# Patient Record
Sex: MALE | Race: WHITE | NOT HISPANIC OR LATINO | Employment: OTHER | URBAN - METROPOLITAN AREA
[De-identification: names, ages, dates, MRNs, and addresses within clinical notes are randomized per-mention and may not be internally consistent; named-entity substitution may affect disease eponyms.]

---

## 2024-03-20 ENCOUNTER — APPOINTMENT (OUTPATIENT)
Dept: CT IMAGING | Facility: CLINIC | Age: 74
End: 2024-03-20
Attending: EMERGENCY MEDICINE
Payer: COMMERCIAL

## 2024-03-20 PROCEDURE — 70450 CT HEAD/BRAIN W/O DYE: CPT

## 2024-03-20 PROCEDURE — 12002 RPR S/N/AX/GEN/TRNK2.6-7.5CM: CPT

## 2024-03-20 PROCEDURE — 99284 EMERGENCY DEPT VISIT MOD MDM: CPT | Mod: 25

## 2024-03-20 ASSESSMENT — COLUMBIA-SUICIDE SEVERITY RATING SCALE - C-SSRS
6. HAVE YOU EVER DONE ANYTHING, STARTED TO DO ANYTHING, OR PREPARED TO DO ANYTHING TO END YOUR LIFE?: NO
1. IN THE PAST MONTH, HAVE YOU WISHED YOU WERE DEAD OR WISHED YOU COULD GO TO SLEEP AND NOT WAKE UP?: NO
2. HAVE YOU ACTUALLY HAD ANY THOUGHTS OF KILLING YOURSELF IN THE PAST MONTH?: NO

## 2024-03-21 ENCOUNTER — HOSPITAL ENCOUNTER (EMERGENCY)
Facility: CLINIC | Age: 74
Discharge: HOME | End: 2024-03-21
Attending: EMERGENCY MEDICINE | Admitting: EMERGENCY MEDICINE
Payer: COMMERCIAL

## 2024-03-21 VITALS
SYSTOLIC BLOOD PRESSURE: 141 MMHG | RESPIRATION RATE: 18 BRPM | HEART RATE: 68 BPM | BODY MASS INDEX: 29.77 KG/M2 | TEMPERATURE: 99 F | OXYGEN SATURATION: 99 % | WEIGHT: 201 LBS | DIASTOLIC BLOOD PRESSURE: 69 MMHG | HEIGHT: 69 IN

## 2024-03-21 DIAGNOSIS — S01.01XA LACERATION OF SCALP WITHOUT FOREIGN BODY, INITIAL ENCOUNTER: ICD-10-CM

## 2024-03-21 DIAGNOSIS — S09.90XA HEAD INJURY, INITIAL ENCOUNTER: ICD-10-CM

## 2024-03-21 RX ORDER — LIDOCAINE HYDROCHLORIDE AND EPINEPHRINE 10; 10 MG/ML; UG/ML
INJECTION, SOLUTION INFILTRATION; PERINEURAL
Status: DISCONTINUED
Start: 2024-03-21 | End: 2024-03-21 | Stop reason: HOSPADM

## 2024-03-21 ASSESSMENT — ACTIVITIES OF DAILY LIVING (ADL): ADLS_ACUITY_SCORE: 35

## 2024-03-21 NOTE — DISCHARGE INSTRUCTIONS
YOUR STAPLES SHOULD BE REMOVED IN 10-14 DAYS.       Discharge Instructions  Head Injury    You have been seen today for a head injury. Your evaluation included a history and physical examination. You may have had a CT (CAT) scan performed, though most head injuries do not require a scan. Based on this evaluation, your provider today does not feel that your head injury is serious.    Generally, every Emergency Department visit should have a follow-up clinic visit with either a primary or a specialty clinic/provider. Please follow-up as instructed by your emergency provider today.  Return to the Emergency Department if:  You are confused or you are not acting right.  Your headache gets worse or you start to have a really bad headache even with your recommended treatment plan.  You vomit (throw up) more than once.  You have a seizure.  You have trouble walking.  You have weakness or paralysis (cannot move) in an arm or a leg.  You have blood or fluid coming from your ears or nose.  You have new symptoms or anything that worries you.    Sleeping:  It is okay for you to sleep, but someone should wake you up if instructed by your provider, and someone should check on you at your usual time to wake up.     Activity:  Do not drive for at least 24 hours.  Do not drive if you have dizzy spells or trouble concentrating, or remembering things.  Do not return to any contact sports until cleared by your regular provider.     MORE INFORMATION:    Concussion:  A concussion is a minor head injury that may cause temporary problems with the way the brain works. Although concussions are important, they are generally not an emergency or a reason that a person needs to be hospitalized. Some concussion symptoms include confusion, amnesia (forgetful), nausea (sick to your stomach) and vomiting (throwing up), dizziness, fatigue, memory or concentration problems, irritability and sleep problems. For most people, concussions are mild and  temporary but some will have more severe and persistent symptoms that require on-going care and treatment.  CT Scans: Your evaluation today may have included a CT scan (CAT scan) to look for things like bleeding or a skull fracture (broken bone).  CT scans involve radiation and too many CT scans can cause serious health problems like cancer, especially in children.  Because of this, your provider may not have ordered a CT scan today if they think you are at low risk for a serious or life threatening problem.    If you were given a prescription for medicine here today, be sure to read all of the information (including the package insert) that comes with your prescription.  This will include important information about the medicine, its side effects, and any warnings that you need to know about.  The pharmacist who fills the prescription can provide more information and answer questions you may have about the medicine.  If you have questions or concerns that the pharmacist cannot address, please call or return to the Emergency Department.     Remember that you can always come back to the Emergency Department if you are not able to see your regular provider in the amount of time listed above, if you get any new symptoms, or if there is anything that worries you.

## 2024-03-21 NOTE — ED NOTES
Emergency Department Technician Wound Irrigation Note:    3/21/2024    12:43 AM      Wound location:  Crown of head and left side of head (two lacerations)    Irrigation Fluid: Normal Saline    Estimated Irrigation Volume (60 mL fluid per cm): 1000 mL    Hardeep Bautista

## 2024-03-21 NOTE — ED TRIAGE NOTES
Fell out of bed and struck head on bedside table causing a laceration to the left scalp. Bleeding controlled at time of arrival. Pt is on warfarin and would like to be evaluated to make sure he does not have any bleeding in the brain.   Denies LOC. AAOX4.  Reports INR 2 weeks ago was 2.1   Triage Assessment (Adult)       Row Name 03/20/24 3994          Triage Assessment    Airway WDL WDL        Respiratory WDL    Respiratory WDL WDL        Skin Circulation/Temperature WDL    Skin Circulation/Temperature WDL X;all  lac        Peripheral/Neurovascular WDL    Peripheral Neurovascular WDL WDL

## 2024-03-21 NOTE — ED PROVIDER NOTES
"  History     Chief Complaint:  Head Laceration       HPI   Ozzy Henning is a 73 year old male who presents to the ED for evaluation of head injury.  The patient reports he was trying to get up out of bed when he slipped and bumped his head on the bedside table.  He denies any loss of consciousness.  No headache or neck pain.  He denies any numbness tingling or weakness.  He denies any other injuries.  No chest pain, shortness of breath, abdominal pain.  No injuries to the extremities.  Patient is anticoagulated on warfarin.  No vomiting, vision changes.  He denies any other symptoms at this time.  Last Tdap was in 2020.      Independent Historian:   None - Patient Only    Review of External Notes:  I reviewed the patient's immunization records ([facility]) last Tdap booster was in 2020.    Medications:    Warfarin    Past Medical History:    No past medical history on file.    Past Surgical History:    No past surgical history on file.     Physical Exam   Patient Vitals for the past 24 hrs:   BP Temp Temp src Pulse Resp SpO2 Height Weight   03/20/24 2247 (!) 168/80 99  F (37.2  C) Temporal 77 16 99 % 1.753 m (5' 9\") 91.2 kg (201 lb)        Physical Exam  General: Well appearing, nontoxic.  Resting comfortably  Head:  Curvilinear laceration to the apex of the scalp measuring 4 cm.  Mild gaping of the wound.  No significant active bleeding or foreign body.  Additional 2 mm well-approximated superficial laceration to the left lateral scalp.  No bleeding or foreign body in this location.  Eyes:  Pupils are equal, round, reactive to light EOMI, no nystagmus     Conjunctivae non-injected and sclerae white  ENT:    The external nose is normal    Pinnae are normal  Neck:  Normal range of motion. Cervical spine nontender, no stepoffs     There is no rigidity noted    Trachea is in the midline  CV:  Extremities warm and well-perfused.  Resp:  There is no tachypnea    No increased work of breathing  GI:  Abdomen is soft, no " rigidity or guarding    No distension, or mass    No tenderness or rebound tenderness   MS:  Normal muscular tone. Full painless ROM of all extremities. Extremities non tender to palpation and atraumatic.     Symmetric motor strength    No lower extremity edema  Skin:  No rash or acute skin lesions note  Neuro:  A&Ox3, GCS 15    CN II - XII intact    Speech clear, fluent, and normal    Strength 5/5 and symmetric in bilateral upper and lower extremities.    No pronator drift. No leg drift. SILT throughout.    No ankle clonus    FTN testing normal. No tremor.     No meningismus   Psych: Normal affect. Appropriate interactions.     Emergency Department Course   No results found for this or any previous visit.    Imaging:  Head CT w/o contrast   Final Result   IMPRESSION:   1.  No CT evidence for acute intracranial process. No calvarial fracture.   2.  Brain atrophy and presumed chronic microvascular ischemic changes as above.           Laboratory:  Labs Ordered and Resulted from Time of ED Arrival to Time of ED Departure - No data to display     [facility]    -Laceration Repair    Date/Time: 3/21/2024 1:05 AM    Performed by: David Cheek MD  Authorized by: David Cheek MD    Risks, benefits and alternatives discussed.      ANESTHESIA (see MAR for exact dosages):     Anesthesia method:  Local infiltration    Local anesthetic:  Lidocaine 1% WITH epi  LACERATION DETAILS     Location:  Scalp    Scalp location:  Crown    Length (cm):  4    REPAIR TYPE:     Repair type:  Simple    EXPLORATION:     Hemostasis achieved with:  Epinephrine and direct pressure    Wound exploration: entire depth of wound probed and visualized      Wound extent: fascia not violated, no foreign body, no nerve damage, no tendon damage, no underlying fracture and no vascular damage      Contaminated: no      TREATMENT:     Area cleansed with:  Saline    Amount of cleaning:  Standard    Irrigation solution:  Sterile  saline    Irrigation method:  Pressure wash    SKIN REPAIR     Repair method:  Staples    Number of staples:  4    APPROXIMATION     Approximation:  Close    POST-PROCEDURE DETAILS     Dressing:  Antibiotic ointment and sterile dressing      PROCEDURE    Patient Tolerance:  Patient tolerated the procedure well with no immediate complications       Emergency Department Course & Assessments:             Interventions:  Medications   lidocaine 1% with EPINEPHrine 1:100,000 1 %-1:[id number] injection (has no administration in time range)        Assessments, Independent Interpretation, Consults/Discussion of Management/Tests:  ED Course as of 03/21/24 0108   u Mar 21, 2024   0038 I performed an independent interpretation of the patient's Head CT. No large volume intracranial hemorrhage or midline shift seen.    0039 Patient seen and evaluated        Social Determinants of Health affecting care:  None    Disposition:  The patient was discharged to home.     Impression & Plan      Medical Decision Making:  Ozzy Henning is a 73 year old male who presents to the ED for evaluation of head injury resulting in lacerations to the scalp.  No loss of consciousness.  On my evaluation he is well-appearing, hemodynamically stable and afebrile.  He is anticoagulated on warfarin.  CT scan of the head was negative for any acute intracranial injury, hemorrhage or fractures.  No other injuries on head to toe trauma evaluation.  No neurologic deficits.  Tetanus is up-to-date.  The larger laceration was repaired as noted above with staples.  The smaller laceration was well-approximated and did not need primary closure.  Wounds were dressed.  No complications.  No active bleeding.  I discussed the possibility of delayed intracranial hemorrhage with the patient and he should return immediately for any worsening symptoms, severe headache, numbness weakness or any other worsening.  Close follow-up with PCP is recommended.  Staples should be  removed in 10 to 14 days.  Patient agreeable to plan of care.  Close return precautions provided and he was discharged in stable and improved condition.      Diagnosis:    ICD-10-CM    1. Laceration of scalp without foreign body, initial encounter  S01.01XA       2. Head injury, initial encounter  S09.90XA            Discharge Medications:  New Prescriptions    No medications on file          3/21/2024   David Cheek MD Daro, Ryan Clay, MD  03/21/24 0108

## 2025-04-14 ENCOUNTER — APPOINTMENT (OUTPATIENT)
Dept: CT IMAGING | Facility: CLINIC | Age: 75
End: 2025-04-14
Attending: STUDENT IN AN ORGANIZED HEALTH CARE EDUCATION/TRAINING PROGRAM
Payer: COMMERCIAL

## 2025-04-14 ENCOUNTER — HOSPITAL ENCOUNTER (EMERGENCY)
Facility: CLINIC | Age: 75
Discharge: HOME | End: 2025-04-14
Attending: STUDENT IN AN ORGANIZED HEALTH CARE EDUCATION/TRAINING PROGRAM | Admitting: STUDENT IN AN ORGANIZED HEALTH CARE EDUCATION/TRAINING PROGRAM
Payer: COMMERCIAL

## 2025-04-14 VITALS
TEMPERATURE: 98.8 F | SYSTOLIC BLOOD PRESSURE: 148 MMHG | OXYGEN SATURATION: 99 % | DIASTOLIC BLOOD PRESSURE: 78 MMHG | RESPIRATION RATE: 16 BRPM | HEART RATE: 69 BPM

## 2025-04-14 DIAGNOSIS — S09.90XA CLOSED HEAD INJURY, INITIAL ENCOUNTER: ICD-10-CM
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
## 2025-04-14 DIAGNOSIS — S00.03XA SCALP HEMATOMA, INITIAL ENCOUNTER: ICD-10-CM

## 2025-04-14 LAB
BASOPHILS # BLD AUTO: 0.1 10E3/UL (ref 0–0.2)
BASOPHILS NFR BLD AUTO: 1 %
EOSINOPHIL # BLD AUTO: 0.1 10E3/UL (ref 0–0.7)
EOSINOPHIL NFR BLD AUTO: 1 %
ERYTHROCYTE [DISTWIDTH] IN BLOOD BY AUTOMATED COUNT: 13.1 % (ref 10–15)
HCT VFR BLD AUTO: 34 % (ref 40–53)
HGB BLD-MCNC: 10.8 G/DL (ref 13.3–17.7)
HOLD SPECIMEN: NORMAL
HOLD SPECIMEN: NORMAL
IMM GRANULOCYTES # BLD: 0 10E3/UL
IMM GRANULOCYTES NFR BLD: 0 %
INR PPP: 1.99 (ref 0.85–1.15)
LYMPHOCYTES # BLD AUTO: 0.9 10E3/UL (ref 0.8–5.3)
LYMPHOCYTES NFR BLD AUTO: 9 %
MCH RBC QN AUTO: 29.2 PG (ref 26.5–33)
MCHC RBC AUTO-ENTMCNC: 31.8 G/DL (ref 31.5–36.5)
MCV RBC AUTO: 92 FL (ref 78–100)
MONOCYTES # BLD AUTO: 0.6 10E3/UL (ref 0–1.3)
MONOCYTES NFR BLD AUTO: 7 %
NEUTROPHILS # BLD AUTO: 7.4 10E3/UL (ref 1.6–8.3)
NEUTROPHILS NFR BLD AUTO: 82 %
NRBC # BLD AUTO: 0 10E3/UL
NRBC BLD AUTO-RTO: 0 /100
PLATELET # BLD AUTO: 255 10E3/UL (ref 150–450)
RBC # BLD AUTO: 3.7 10E6/UL (ref 4.4–5.9)
WBC # BLD AUTO: 9.1 10E3/UL (ref 4–11)

## 2025-04-14 PROCEDURE — 85041 AUTOMATED RBC COUNT: CPT | Performed by: STUDENT IN AN ORGANIZED HEALTH CARE EDUCATION/TRAINING PROGRAM

## 2025-04-14 PROCEDURE — 70450 CT HEAD/BRAIN W/O DYE: CPT

## 2025-04-14 PROCEDURE — 99284 EMERGENCY DEPT VISIT MOD MDM: CPT | Mod: 25

## 2025-04-14 PROCEDURE — 85004 AUTOMATED DIFF WBC COUNT: CPT | Performed by: STUDENT IN AN ORGANIZED HEALTH CARE EDUCATION/TRAINING PROGRAM

## 2025-04-14 PROCEDURE — 36415 COLL VENOUS BLD VENIPUNCTURE: CPT | Performed by: STUDENT IN AN ORGANIZED HEALTH CARE EDUCATION/TRAINING PROGRAM

## 2025-04-14 PROCEDURE — 85610 PROTHROMBIN TIME: CPT | Performed by: STUDENT IN AN ORGANIZED HEALTH CARE EDUCATION/TRAINING PROGRAM

## 2025-04-14 ASSESSMENT — ACTIVITIES OF DAILY LIVING (ADL)
ADLS_ACUITY_SCORE: 41
ADLS_ACUITY_SCORE: 41

## 2025-04-14 NOTE — ED TRIAGE NOTES
Tripped on uneven sidewalk causing fall. Hit forehead on sidewalk. Abrasion and swelling noted to forehead. Takes wafarin. No LOC. Bleeding controlled. Dressing applied.

## 2025-04-14 NOTE — ED PROVIDER NOTES
Emergency Department Note      History of Present Illness     Chief Complaint   Fall and Head Injury      HPI   Ozzy Henning is a 75 year old male with a history of valve replacement on warfarin, who presents to the ED today for evaluation after a fall. The patient reports he tripped on an uneven sidewalk earlier today which caused him to fall, hit his head, and injured his left knee. He states he felt fine before the fall, and he didn't lose consciousness or vomit after the fall. At presentation, the patient endorses pain in his left knee and mild swelling right knee. He denies any neck pain. Denies any numbness or tingling anywhere. He states he takes Warfarin daily, having last taken it last night. The patient states he isn't sure if his tetanus is up to date.     Independent Historian   None    Review of External Notes   none    Past Medical History     Medical History and Problem List   No past medical history on file.    Medications   No current outpatient medications on file.      Surgical History   Past Surgical History:   Procedure Laterality Date    IR LUMBAR PUNCTURE  1/5/2023    IR LUMBAR PUNCTURE  5/12/2022       Physical Exam     Patient Vitals for the past 24 hrs:   BP Temp Temp src Pulse Resp SpO2   04/14/25 1819 (!) 148/78 -- -- 69 -- 99 %   04/14/25 1611 (!) 169/91 -- -- -- -- --   04/14/25 1609 -- 98.8  F (37.1  C) Temporal 84 16 97 %     Physical Exam  GENERAL: Patient well-appearing  HEAD: Hematoma and abrasion on forehead. No mackey sign, raccoon eyes or CSF leak  Eyes: Anicteric. PERRL  NOSE: No active bleeding  MOUTH: Moist mucosa  THROAT: Patent airway. Pharynx clear.   Neck: No rigidity. No midline spinal tenderness  Back: No midline spinal tenderness, crepitus or gross deformity  CV: RRR, no murmurs, rubs or gallops  PULM: CTAB with good aeration; no retractions, rales, rhonchi, or wheezing  ABD: Soft, nontender, nondistended, no guarding, no peritoneal signs, no bruising  DERM: No  rash. Skin warm and dry  EXTREMITY: Moving all extremities without difficulty.  Mild abrasions to the knees with minimal swelling right anterior knee.  Can fully flex and extend both knees.  No bony tenderness or deformity of the knees.  Ambulates without issue.  Pelvis: Stable  VASCULAR: Symmetric pulses bilaterally  Neuro: Cranial nerves II through XII gross intact.  Strength 5/5 bilateral upper and lower extremities.  No ataxia.  Diagnostics     Lab Results   Labs Ordered and Resulted from Time of ED Arrival to Time of ED Departure   INR - Abnormal       Result Value    INR 1.99 (*)    CBC WITH PLATELETS AND DIFFERENTIAL - Abnormal    WBC Count 9.1      RBC Count 3.70 (*)     Hemoglobin 10.8 (*)     Hematocrit 34.0 (*)     MCV 92      MCH 29.2      MCHC 31.8      RDW 13.1      Platelet Count 255      % Neutrophils 82      % Lymphocytes 9      % Monocytes 7      % Eosinophils 1      % Basophils 1      % Immature Granulocytes 0      NRBCs per 100 WBC 0      Absolute Neutrophils 7.4      Absolute Lymphocytes 0.9      Absolute Monocytes 0.6      Absolute Eosinophils 0.1      Absolute Basophils 0.1      Absolute Immature Granulocytes 0.0      Absolute NRBCs 0.0         Imaging   CT Head w/o Contrast   Final Result   IMPRESSION:     1.  No evidence of acute intracranial hemorrhage or mass effect.   2.  Right frontal scalp soft tissue swelling and hematoma.             Independent Interpretation   CT Head: No intracranial hemorrhage.    ED Course      Medications Administered   Medications - No data to display    Procedures   Procedures     Discussion of Management   None    ED Course   ED Course as of 04/14/25 1956 Mon Apr 14, 2025 1623 I obtained history and examined the patient as noted above.        Additional Documentation  None    Medical Decision Making / Diagnosis     CMS Diagnoses: None    MIPS       None    [facility]   Ozzy Henning is a 75 year old male     Symptoms most consistent with head trauma after  mechanical fall.    DDx considered intracranial bleed, skull fracture, severe TBI.    CT head unremarkable.  No spinal tenderness.  No paresthesias.  Walking without issue.  Ranging neck in all directions without issue.  Do not think neck imaging indicated.    Labs showing INR just under 2.    Forehead wound was cleaned and bandaged.    Continues to be well-appearing on repeat check.    Discussed risks of delayed bleed on warfarin, but do not think he requires admission or emergent repeat CT scan for this.    I have evaluated the patient for acute medical emergencies and have clinically decided no further acute medical interventions are required. Patient stable for discharge. All questions answered. Given strict return precautions. Patient content with plan. The differential diagnosis and treatment modalities were discussed thoroughly with the patient. Recommended PCP follow-up in 2-3 days.      Disposition   The patient was discharged.     Diagnosis     ICD-10-CM    1. Fall, initial encounter  W19.XXXA       2. Scalp hematoma, initial encounter  S00.03XA       3. Closed head injury, initial encounter  S09.90XA            Discharge Medications   There are no discharge medications for this patient.        Scribe Disclosure:  I, Cherri Mitchell, am serving as a scribe at 6:12 PM on 4/14/2025 to document services personally performed by Chip Castillo MD based on my observations and the provider's statements to me.        Chip Castillo MD  04/14/25 1956  Tetanus was also checked and is up-to-date.  Patient was alert and answering questions appropriately upon discharge.     Chip Castillo MD  04/14/25 1957